# Patient Record
Sex: MALE | Race: WHITE | NOT HISPANIC OR LATINO | URBAN - METROPOLITAN AREA
[De-identification: names, ages, dates, MRNs, and addresses within clinical notes are randomized per-mention and may not be internally consistent; named-entity substitution may affect disease eponyms.]

---

## 2021-03-04 ENCOUNTER — IMPORTED ENCOUNTER (OUTPATIENT)
Dept: URBAN - METROPOLITAN AREA CLINIC 50 | Facility: CLINIC | Age: 76
End: 2021-03-04

## 2021-03-10 ENCOUNTER — IMPORTED ENCOUNTER (OUTPATIENT)
Dept: URBAN - METROPOLITAN AREA CLINIC 50 | Facility: CLINIC | Age: 76
End: 2021-03-10

## 2021-03-24 ENCOUNTER — IMPORTED ENCOUNTER (OUTPATIENT)
Dept: URBAN - METROPOLITAN AREA CLINIC 50 | Facility: CLINIC | Age: 76
End: 2021-03-24

## 2021-03-30 ENCOUNTER — IMPORTED ENCOUNTER (OUTPATIENT)
Dept: URBAN - METROPOLITAN AREA CLINIC 50 | Facility: CLINIC | Age: 76
End: 2021-03-30

## 2021-04-17 ASSESSMENT — VISUAL ACUITY
OD_SC: 20/30
OS_BAT: 20/80
OS_CC: J1+
OD_OTHER: 20/80. 20/200.
OS_SC: 20/60-
OD_PH: 20/25
OS_SC: 20/20-
OS_PH: 20/40
OD_BAT: 20/80
OS_SC: 20/20-
OD_SC: 20/30-
OD_CC: J1+
OS_OTHER: 20/80. 20/200.
OD_PH: 20/30
OD_SC: 20/30-

## 2021-04-17 ASSESSMENT — TONOMETRY
OD_IOP_MMHG: 12
OS_IOP_MMHG: 14
OD_IOP_MMHG: 12
OS_IOP_MMHG: 12
OD_IOP_MMHG: 14
OS_IOP_MMHG: 13
OS_IOP_MMHG: 13
OD_IOP_MMHG: 14

## 2021-04-28 ENCOUNTER — LID CONSULT (OUTPATIENT)
Dept: URBAN - METROPOLITAN AREA CLINIC 49 | Facility: CLINIC | Age: 76
End: 2021-04-28

## 2021-04-28 DIAGNOSIS — H02.834: ICD-10-CM

## 2021-04-28 DIAGNOSIS — H02.831: ICD-10-CM

## 2021-04-28 PROCEDURE — 92012 INTRM OPH EXAM EST PATIENT: CPT

## 2021-04-28 ASSESSMENT — VISUAL ACUITY
OS_SC: 20/25-2
OD_SC: 20/30+2

## 2021-04-28 NOTE — PATIENT DISCUSSION
D/w patient that he as a slightly lower set brow and that the best surgery would be a brow lift, however there is enough skin to do a bilateral bleph and give patient good results.

## 2021-04-28 NOTE — PATIENT DISCUSSION
Risks and benefits of eyelid surgery discussed including bruising and swelling, infection, dry eye, asymmetry, loss of vision, and/or need for additional surgery. All questions answered. Patients wishes to proceed with lid surgery.

## 2021-05-19 ENCOUNTER — PRE-OP - (OUTPATIENT)
Dept: URBAN - METROPOLITAN AREA CLINIC 49 | Facility: CLINIC | Age: 76
End: 2021-05-19

## 2021-05-19 VITALS — DIASTOLIC BLOOD PRESSURE: 81 MMHG | HEIGHT: 60 IN | SYSTOLIC BLOOD PRESSURE: 160 MMHG

## 2021-05-19 DIAGNOSIS — H02.831: ICD-10-CM

## 2021-05-19 DIAGNOSIS — H02.834: ICD-10-CM

## 2021-05-19 PROCEDURE — PREOP PRE OP VISIT

## 2021-05-19 ASSESSMENT — VISUAL ACUITY
OS_SC: 20/20
OD_SC: 20/30+2

## 2021-05-19 ASSESSMENT — TONOMETRY
OD_IOP_MMHG: 16
OS_IOP_MMHG: 16

## 2021-05-19 NOTE — PATIENT DISCUSSION
Patient states he can stop his BASA on his own. Recommend patient stop BASA 6 days prior to surgery. Patient agrees and will stop 6 days prior to surgery.

## 2021-06-08 ENCOUNTER — SURGERY/PROCEDURE (OUTPATIENT)
Dept: URBAN - METROPOLITAN AREA SURGERY 16 | Facility: SURGERY | Age: 76
End: 2021-06-08

## 2021-06-08 DIAGNOSIS — H02.834: ICD-10-CM

## 2021-06-08 DIAGNOSIS — H02.831: ICD-10-CM

## 2021-06-08 PROCEDURE — 15823 BLEPHARP UPR EYELID XCSV SKN: CPT

## 2021-06-08 RX ORDER — ERYTHROMYCIN 5 MG/G
1/2 OINTMENT OPHTHALMIC
Start: 2021-06-08

## 2021-06-16 ENCOUNTER — 1 WEEK POST-OP (OUTPATIENT)
Dept: URBAN - METROPOLITAN AREA CLINIC 49 | Facility: CLINIC | Age: 76
End: 2021-06-16

## 2021-06-16 DIAGNOSIS — Z98.890: ICD-10-CM

## 2021-06-16 PROCEDURE — 99024 POSTOP FOLLOW-UP VISIT: CPT

## 2021-06-16 ASSESSMENT — VISUAL ACUITY
OS_SC: 20/20
OD_SC: 20/20-1

## 2021-07-14 ENCOUNTER — 4 WEEK POST-OP (OUTPATIENT)
Dept: URBAN - METROPOLITAN AREA CLINIC 49 | Facility: CLINIC | Age: 76
End: 2021-07-14

## 2021-07-14 DIAGNOSIS — Z98.890: ICD-10-CM

## 2021-07-14 PROCEDURE — 92285 EXTERNAL OCULAR PHOTOGRAPHY: CPT

## 2021-07-14 PROCEDURE — 99024 POSTOP FOLLOW-UP VISIT: CPT

## 2021-07-14 ASSESSMENT — VISUAL ACUITY
OD_SC: 20/20-1
OS_SC: 20/20

## 2022-03-23 ENCOUNTER — COMPREHENSIVE EXAM (OUTPATIENT)
Dept: URBAN - METROPOLITAN AREA CLINIC 49 | Facility: CLINIC | Age: 77
End: 2022-03-23

## 2022-03-23 DIAGNOSIS — H35.373: ICD-10-CM

## 2022-03-23 DIAGNOSIS — H35.033: ICD-10-CM

## 2022-03-23 DIAGNOSIS — H43.813: ICD-10-CM

## 2022-03-23 DIAGNOSIS — H35.363: ICD-10-CM

## 2022-03-23 PROCEDURE — 92134 CPTRZ OPH DX IMG PST SGM RTA: CPT

## 2022-03-23 PROCEDURE — 92014 COMPRE OPH EXAM EST PT 1/>: CPT

## 2022-03-23 ASSESSMENT — VISUAL ACUITY
OS_SC: 20/30
OD_SC: 20/30
OU_SC: J1+

## 2022-03-23 ASSESSMENT — TONOMETRY
OD_IOP_MMHG: 16
OS_IOP_MMHG: 16

## 2024-04-03 ENCOUNTER — COMPREHENSIVE EXAM (OUTPATIENT)
Dept: URBAN - METROPOLITAN AREA CLINIC 49 | Facility: LOCATION | Age: 79
End: 2024-04-03

## 2024-04-03 DIAGNOSIS — H35.373: ICD-10-CM

## 2024-04-03 DIAGNOSIS — H02.831: ICD-10-CM

## 2024-04-03 DIAGNOSIS — H43.813: ICD-10-CM

## 2024-04-03 DIAGNOSIS — H35.3131: ICD-10-CM

## 2024-04-03 DIAGNOSIS — H02.834: ICD-10-CM

## 2024-04-03 DIAGNOSIS — H35.033: ICD-10-CM

## 2024-04-03 DIAGNOSIS — H04.123: ICD-10-CM

## 2024-04-03 PROCEDURE — 92134 CPTRZ OPH DX IMG PST SGM RTA: CPT

## 2024-04-03 PROCEDURE — 99214 OFFICE O/P EST MOD 30 MIN: CPT

## 2024-04-03 ASSESSMENT — VISUAL ACUITY
OD_SC: 20/30
OU_SC: J1+ @ 15IN
OS_SC: 20/25-2

## 2024-04-03 ASSESSMENT — KERATOMETRY
OD_AXISANGLE2_DEGREES: 10
OS_AXISANGLE2_DEGREES: 172
OS_K2POWER_DIOPTERS: 44.75
OD_K1POWER_DIOPTERS: 43.5
OD_K2POWER_DIOPTERS: 44.50
OS_K1POWER_DIOPTERS: 43.75
OS_AXISANGLE_DEGREES: 82
OD_AXISANGLE_DEGREES: 100

## 2024-04-03 ASSESSMENT — TONOMETRY
OD_IOP_MMHG: 15
OS_IOP_MMHG: 15

## 2025-04-15 ENCOUNTER — COMPREHENSIVE EXAM (OUTPATIENT)
Age: 80
End: 2025-04-15

## 2025-04-15 DIAGNOSIS — H02.834: ICD-10-CM

## 2025-04-15 DIAGNOSIS — H35.033: ICD-10-CM

## 2025-04-15 DIAGNOSIS — H35.3131: ICD-10-CM

## 2025-04-15 DIAGNOSIS — H35.373: ICD-10-CM

## 2025-04-15 DIAGNOSIS — H04.123: ICD-10-CM

## 2025-04-15 DIAGNOSIS — H43.813: ICD-10-CM

## 2025-04-15 DIAGNOSIS — H02.831: ICD-10-CM

## 2025-04-15 PROCEDURE — 92134 CPTRZ OPH DX IMG PST SGM RTA: CPT

## 2025-04-15 PROCEDURE — 99214 OFFICE O/P EST MOD 30 MIN: CPT
